# Patient Record
Sex: MALE | Race: OTHER | HISPANIC OR LATINO | URBAN - METROPOLITAN AREA
[De-identification: names, ages, dates, MRNs, and addresses within clinical notes are randomized per-mention and may not be internally consistent; named-entity substitution may affect disease eponyms.]

---

## 2022-01-22 ENCOUNTER — EMERGENCY (EMERGENCY)
Facility: HOSPITAL | Age: 32
LOS: 1 days | Discharge: HOME | End: 2022-01-22
Attending: EMERGENCY MEDICINE
Payer: MEDICAID

## 2022-01-22 VITALS
HEART RATE: 99 BPM | SYSTOLIC BLOOD PRESSURE: 161 MMHG | RESPIRATION RATE: 20 BRPM | DIASTOLIC BLOOD PRESSURE: 97 MMHG | TEMPERATURE: 99 F | OXYGEN SATURATION: 96 % | WEIGHT: 297.62 LBS

## 2022-01-22 VITALS
DIASTOLIC BLOOD PRESSURE: 88 MMHG | OXYGEN SATURATION: 99 % | SYSTOLIC BLOOD PRESSURE: 132 MMHG | HEART RATE: 82 BPM | RESPIRATION RATE: 18 BRPM

## 2022-01-22 PROCEDURE — 99284 EMERGENCY DEPT VISIT MOD MDM: CPT

## 2022-01-22 PROCEDURE — 99284 EMERGENCY DEPT VISIT MOD MDM: CPT | Mod: 25

## 2022-01-22 PROCEDURE — 96372 THER/PROPH/DIAG INJ SC/IM: CPT

## 2022-01-22 PROCEDURE — 72131 CT LUMBAR SPINE W/O DYE: CPT | Mod: MA

## 2022-01-22 PROCEDURE — 72131 CT LUMBAR SPINE W/O DYE: CPT | Mod: 26,MA

## 2022-01-22 RX ORDER — DEXAMETHASONE 0.5 MG/5ML
10 ELIXIR ORAL ONCE
Refills: 0 | Status: COMPLETED | OUTPATIENT
Start: 2022-01-22 | End: 2022-01-22

## 2022-01-22 RX ORDER — IBUPROFEN 200 MG
1 TABLET ORAL
Qty: 15 | Refills: 0
Start: 2022-01-22 | End: 2022-01-26

## 2022-01-22 RX ORDER — OXYCODONE AND ACETAMINOPHEN 5; 325 MG/1; MG/1
1 TABLET ORAL ONCE
Refills: 0 | Status: DISCONTINUED | OUTPATIENT
Start: 2022-01-22 | End: 2022-01-22

## 2022-01-22 RX ORDER — METHOCARBAMOL 500 MG/1
500 TABLET, FILM COATED ORAL ONCE
Refills: 0 | Status: DISCONTINUED | OUTPATIENT
Start: 2022-01-22 | End: 2022-01-22

## 2022-01-22 RX ORDER — TRAMADOL HYDROCHLORIDE 50 MG/1
1 TABLET ORAL
Qty: 15 | Refills: 0
Start: 2022-01-22 | End: 2022-01-26

## 2022-01-22 RX ORDER — DIAZEPAM 5 MG
1 TABLET ORAL
Qty: 12 | Refills: 0
Start: 2022-01-22 | End: 2022-01-25

## 2022-01-22 RX ORDER — METHOCARBAMOL 500 MG/1
750 TABLET, FILM COATED ORAL ONCE
Refills: 0 | Status: COMPLETED | OUTPATIENT
Start: 2022-01-22 | End: 2022-01-22

## 2022-01-22 RX ORDER — KETOROLAC TROMETHAMINE 30 MG/ML
60 SYRINGE (ML) INJECTION ONCE
Refills: 0 | Status: DISCONTINUED | OUTPATIENT
Start: 2022-01-22 | End: 2022-01-22

## 2022-01-22 RX ADMIN — METHOCARBAMOL 750 MILLIGRAM(S): 500 TABLET, FILM COATED ORAL at 13:39

## 2022-01-22 RX ADMIN — OXYCODONE AND ACETAMINOPHEN 1 TABLET(S): 5; 325 TABLET ORAL at 13:56

## 2022-01-22 RX ADMIN — Medication 60 MILLIGRAM(S): at 13:26

## 2022-01-22 RX ADMIN — Medication 60 MILLIGRAM(S): at 13:56

## 2022-01-22 RX ADMIN — Medication 10 MILLIGRAM(S): at 13:26

## 2022-01-22 RX ADMIN — OXYCODONE AND ACETAMINOPHEN 1 TABLET(S): 5; 325 TABLET ORAL at 13:26

## 2022-01-22 NOTE — ED PROVIDER NOTE - OBJECTIVE STATEMENT
31m no relevant med hx pw 4 days of bl lower back pain. radiates into the right lower ext. Patient denies numbness, tingling or weakness of the lower extremity and denies retention or incontinence of the bowel or bladder. took tylenol and warm bath to help with the pain, but it didn't help that much. ros neg for ha, vision loss, rhinorrhea, cp, sob, abd pain, vomiting, fever, chills, rash, bleeding, dysuria, testicular pain, depression

## 2022-01-22 NOTE — ED ADULT NURSE NOTE - MUSCULOSKELETAL ASSESSMENT
Alexander Cisse returned a call from [facility] concern his refill    Please call again - - -

## 2022-01-22 NOTE — ED PROVIDER NOTE - PATIENT PORTAL LINK FT
You can access the FollowMyHealth Patient Portal offered by [facility] by registering at the following website: [email or web address]. By joining [employer]’s FollowMyHealth portal, you will also be able to view your health information using other applications (apps) compatible with our system.

## 2022-01-22 NOTE — ED PROVIDER NOTE - NSFOLLOWUPINSTRUCTIONS_ED_ALL_ED_FT
Benjamin Perez IBUPROFEN para dolor moderado.    Anade TRAMADOL si el dolor se ponga mas grave.    Benjamin Perez VALIUM para relajar los musculos.     Kemp medicamentos pueden causar somnolencia, asi que no los tome si tiene que quedarse alerta.    Judy compresa caliente le puede ayudar tambien con tension en los musculos. [place] IBUPROFEN para dolor moderado.    Anade TRAMADOL si el dolor se ponga mas grave.    [place] VALIUM para relajar los musculos.     Kemp medicamentos pueden causar somnolencia, asi que no los tome si tiene que quedarse alerta.    Nikia compresa caliente le puede ayudar tambien con tension en los musculos.    Kym nikia kandace con la especialista de la mckenna.

## 2022-04-23 ENCOUNTER — EMERGENCY (EMERGENCY)
Facility: HOSPITAL | Age: 32
LOS: 1 days | Discharge: HOME | End: 2022-04-23
Attending: STUDENT IN AN ORGANIZED HEALTH CARE EDUCATION/TRAINING PROGRAM
Payer: MEDICAID

## 2022-04-23 VITALS
HEART RATE: 93 BPM | OXYGEN SATURATION: 98 % | RESPIRATION RATE: 16 BRPM | TEMPERATURE: 98 F | SYSTOLIC BLOOD PRESSURE: 131 MMHG | DIASTOLIC BLOOD PRESSURE: 80 MMHG

## 2022-04-23 VITALS
TEMPERATURE: 100 F | DIASTOLIC BLOOD PRESSURE: 94 MMHG | OXYGEN SATURATION: 98 % | WEIGHT: 286.6 LBS | HEART RATE: 124 BPM | RESPIRATION RATE: 17 BRPM | SYSTOLIC BLOOD PRESSURE: 146 MMHG

## 2022-04-23 LAB
ALBUMIN SERPL ELPH-MCNC: 3.9 G/DL — SIGNIFICANT CHANGE UP (ref 3.5–5)
ALP SERPL-CCNC: 91 U/L — SIGNIFICANT CHANGE UP (ref 40–120)
ALT FLD-CCNC: 34 U/L DA — SIGNIFICANT CHANGE UP (ref 10–60)
ANION GAP SERPL CALC-SCNC: 6 MMOL/L — SIGNIFICANT CHANGE UP (ref 5–17)
APPEARANCE UR: CLEAR — SIGNIFICANT CHANGE UP
APTT BLD: 35.4 SEC — SIGNIFICANT CHANGE UP (ref 27.5–35.5)
AST SERPL-CCNC: 14 U/L — SIGNIFICANT CHANGE UP (ref 10–40)
BACTERIA # UR AUTO: ABNORMAL /HPF
BASOPHILS # BLD AUTO: 0.04 K/UL — SIGNIFICANT CHANGE UP (ref 0–0.2)
BASOPHILS NFR BLD AUTO: 0.3 % — SIGNIFICANT CHANGE UP (ref 0–2)
BILIRUB SERPL-MCNC: 0.5 MG/DL — SIGNIFICANT CHANGE UP (ref 0.2–1.2)
BILIRUB UR-MCNC: NEGATIVE — SIGNIFICANT CHANGE UP
BUN SERPL-MCNC: 10 MG/DL — SIGNIFICANT CHANGE UP (ref 7–18)
CALCIUM SERPL-MCNC: 9.6 MG/DL — SIGNIFICANT CHANGE UP (ref 8.4–10.5)
CHLORIDE SERPL-SCNC: 101 MMOL/L — SIGNIFICANT CHANGE UP (ref 96–108)
CO2 SERPL-SCNC: 28 MMOL/L — SIGNIFICANT CHANGE UP (ref 22–31)
COLOR SPEC: YELLOW — SIGNIFICANT CHANGE UP
CREAT SERPL-MCNC: 0.75 MG/DL — SIGNIFICANT CHANGE UP (ref 0.5–1.3)
DIFF PNL FLD: NEGATIVE — SIGNIFICANT CHANGE UP
EGFR: 123 ML/MIN/1.73M2 — SIGNIFICANT CHANGE UP
EOSINOPHIL # BLD AUTO: 0.15 K/UL — SIGNIFICANT CHANGE UP (ref 0–0.5)
EOSINOPHIL NFR BLD AUTO: 1.1 % — SIGNIFICANT CHANGE UP (ref 0–6)
EPI CELLS # UR: SIGNIFICANT CHANGE UP /HPF
FLUAV AG NPH QL: SIGNIFICANT CHANGE UP
FLUBV AG NPH QL: SIGNIFICANT CHANGE UP
GLUCOSE SERPL-MCNC: 106 MG/DL — HIGH (ref 70–99)
GLUCOSE UR QL: NEGATIVE — SIGNIFICANT CHANGE UP
HCT VFR BLD CALC: 47.4 % — SIGNIFICANT CHANGE UP (ref 39–50)
HGB BLD-MCNC: 16 G/DL — SIGNIFICANT CHANGE UP (ref 13–17)
HIV 1 & 2 AB SERPL IA.RAPID: SIGNIFICANT CHANGE UP
IMM GRANULOCYTES NFR BLD AUTO: 0.3 % — SIGNIFICANT CHANGE UP (ref 0–1.5)
INR BLD: 1.01 RATIO — SIGNIFICANT CHANGE UP (ref 0.88–1.16)
KETONES UR-MCNC: NEGATIVE — SIGNIFICANT CHANGE UP
LACTATE SERPL-SCNC: 1 MMOL/L — SIGNIFICANT CHANGE UP (ref 0.7–2)
LEUKOCYTE ESTERASE UR-ACNC: NEGATIVE — SIGNIFICANT CHANGE UP
LYMPHOCYTES # BLD AUTO: 2.91 K/UL — SIGNIFICANT CHANGE UP (ref 1–3.3)
LYMPHOCYTES # BLD AUTO: 21.6 % — SIGNIFICANT CHANGE UP (ref 13–44)
MCHC RBC-ENTMCNC: 28.1 PG — SIGNIFICANT CHANGE UP (ref 27–34)
MCHC RBC-ENTMCNC: 33.8 GM/DL — SIGNIFICANT CHANGE UP (ref 32–36)
MCV RBC AUTO: 83.2 FL — SIGNIFICANT CHANGE UP (ref 80–100)
MONOCYTES # BLD AUTO: 0.79 K/UL — SIGNIFICANT CHANGE UP (ref 0–0.9)
MONOCYTES NFR BLD AUTO: 5.9 % — SIGNIFICANT CHANGE UP (ref 2–14)
NEUTROPHILS # BLD AUTO: 9.56 K/UL — HIGH (ref 1.8–7.4)
NEUTROPHILS NFR BLD AUTO: 70.8 % — SIGNIFICANT CHANGE UP (ref 43–77)
NITRITE UR-MCNC: NEGATIVE — SIGNIFICANT CHANGE UP
NRBC # BLD: 0 /100 WBCS — SIGNIFICANT CHANGE UP (ref 0–0)
PH UR: 6 — SIGNIFICANT CHANGE UP (ref 5–8)
PLATELET # BLD AUTO: 357 K/UL — SIGNIFICANT CHANGE UP (ref 150–400)
POTASSIUM SERPL-MCNC: 4.2 MMOL/L — SIGNIFICANT CHANGE UP (ref 3.5–5.3)
POTASSIUM SERPL-SCNC: 4.2 MMOL/L — SIGNIFICANT CHANGE UP (ref 3.5–5.3)
PROT SERPL-MCNC: 8.8 G/DL — HIGH (ref 6–8.3)
PROT UR-MCNC: 30 MG/DL
PROTHROM AB SERPL-ACNC: 12 SEC — SIGNIFICANT CHANGE UP (ref 10.5–13.4)
RBC # BLD: 5.7 M/UL — SIGNIFICANT CHANGE UP (ref 4.2–5.8)
RBC # FLD: 13 % — SIGNIFICANT CHANGE UP (ref 10.3–14.5)
RBC CASTS # UR COMP ASSIST: SIGNIFICANT CHANGE UP /HPF (ref 0–2)
SARS-COV-2 RNA SPEC QL NAA+PROBE: SIGNIFICANT CHANGE UP
SODIUM SERPL-SCNC: 135 MMOL/L — SIGNIFICANT CHANGE UP (ref 135–145)
SP GR SPEC: 1.01 — SIGNIFICANT CHANGE UP (ref 1.01–1.02)
UROBILINOGEN FLD QL: NEGATIVE — SIGNIFICANT CHANGE UP
WBC # BLD: 13.49 K/UL — HIGH (ref 3.8–10.5)
WBC # FLD AUTO: 13.49 K/UL — HIGH (ref 3.8–10.5)
WBC UR QL: SIGNIFICANT CHANGE UP /HPF (ref 0–5)

## 2022-04-23 PROCEDURE — 86703 HIV-1/HIV-2 1 RESULT ANTBDY: CPT

## 2022-04-23 PROCEDURE — 99285 EMERGENCY DEPT VISIT HI MDM: CPT

## 2022-04-23 PROCEDURE — 99285 EMERGENCY DEPT VISIT HI MDM: CPT | Mod: 25

## 2022-04-23 PROCEDURE — 87086 URINE CULTURE/COLONY COUNT: CPT

## 2022-04-23 PROCEDURE — 85610 PROTHROMBIN TIME: CPT

## 2022-04-23 PROCEDURE — 70487 CT MAXILLOFACIAL W/DYE: CPT | Mod: 26,MA

## 2022-04-23 PROCEDURE — 83605 ASSAY OF LACTIC ACID: CPT

## 2022-04-23 PROCEDURE — 93005 ELECTROCARDIOGRAM TRACING: CPT

## 2022-04-23 PROCEDURE — 87637 SARSCOV2&INF A&B&RSV AMP PRB: CPT

## 2022-04-23 PROCEDURE — 36415 COLL VENOUS BLD VENIPUNCTURE: CPT

## 2022-04-23 PROCEDURE — 93010 ELECTROCARDIOGRAM REPORT: CPT

## 2022-04-23 PROCEDURE — 71046 X-RAY EXAM CHEST 2 VIEWS: CPT

## 2022-04-23 PROCEDURE — 96361 HYDRATE IV INFUSION ADD-ON: CPT

## 2022-04-23 PROCEDURE — 70487 CT MAXILLOFACIAL W/DYE: CPT | Mod: MA

## 2022-04-23 PROCEDURE — 85730 THROMBOPLASTIN TIME PARTIAL: CPT

## 2022-04-23 PROCEDURE — 71046 X-RAY EXAM CHEST 2 VIEWS: CPT | Mod: 26

## 2022-04-23 PROCEDURE — 80053 COMPREHEN METABOLIC PANEL: CPT

## 2022-04-23 PROCEDURE — 87040 BLOOD CULTURE FOR BACTERIA: CPT

## 2022-04-23 PROCEDURE — 96374 THER/PROPH/DIAG INJ IV PUSH: CPT | Mod: XU

## 2022-04-23 PROCEDURE — 85025 COMPLETE CBC W/AUTO DIFF WBC: CPT

## 2022-04-23 PROCEDURE — 81001 URINALYSIS AUTO W/SCOPE: CPT

## 2022-04-23 RX ORDER — SODIUM CHLORIDE 9 MG/ML
2200 INJECTION INTRAMUSCULAR; INTRAVENOUS; SUBCUTANEOUS ONCE
Refills: 0 | Status: COMPLETED | OUTPATIENT
Start: 2022-04-23 | End: 2022-04-23

## 2022-04-23 RX ORDER — PIPERACILLIN AND TAZOBACTAM 4; .5 G/20ML; G/20ML
3.38 INJECTION, POWDER, LYOPHILIZED, FOR SOLUTION INTRAVENOUS ONCE
Refills: 0 | Status: COMPLETED | OUTPATIENT
Start: 2022-04-23 | End: 2022-04-23

## 2022-04-23 RX ORDER — IBUPROFEN 200 MG
600 TABLET ORAL ONCE
Refills: 0 | Status: COMPLETED | OUTPATIENT
Start: 2022-04-23 | End: 2022-04-23

## 2022-04-23 RX ADMIN — SODIUM CHLORIDE 2200 MILLILITER(S): 9 INJECTION INTRAMUSCULAR; INTRAVENOUS; SUBCUTANEOUS at 08:30

## 2022-04-23 RX ADMIN — Medication 600 MILLIGRAM(S): at 09:28

## 2022-04-23 RX ADMIN — PIPERACILLIN AND TAZOBACTAM 200 GRAM(S): 4; .5 INJECTION, POWDER, LYOPHILIZED, FOR SOLUTION INTRAVENOUS at 09:21

## 2022-04-23 RX ADMIN — SODIUM CHLORIDE 2200 MILLILITER(S): 9 INJECTION INTRAMUSCULAR; INTRAVENOUS; SUBCUTANEOUS at 09:28

## 2022-04-23 RX ADMIN — Medication 600 MILLIGRAM(S): at 08:30

## 2022-04-23 NOTE — ED ADULT NURSE NOTE - OBJECTIVE STATEMENT
Pt AOx4, ambulatory, c/o fever and left jaw pain and swelling since last night. PT denies difficulty breathing or swallowing.

## 2022-04-23 NOTE — ED PROVIDER NOTE - CLINICAL SUMMARY MEDICAL DECISION MAKING FREE TEXT BOX
31M presenting with left facial swelling. no dental procedures. tolerating secretions. low concern for ludwigs but will get labs, CTA max/face. will reassess.

## 2022-04-23 NOTE — ED PROVIDER NOTE - PHYSICAL EXAMINATION
General: well appearing male, no acute distress   HEENT: left lateral mandibular swelling, tenderness to palpation, no submandibular fullness, no dental pain or abscess   Respiratory: normal work of breathing, lungs clear to auscultation bilaterally   Cardiac: regular rate and rhythm   Abdomen: soft, non-tender, no guarding or rebound   MSK: no swelling or tenderness of lower extremities, moving all extremities spontaneously   Skin: warm, dry   Neuro: A&Ox3  Psych: appropriate affect

## 2022-04-23 NOTE — ED PROVIDER NOTE - OBJECTIVE STATEMENT
31M, no significant pmh, presenting with left sided facial pain and swelling. patient reports the swelling has worsened since yesterday. no fever, chest pain, shortness of breath, nausea, vomiting. no recent dental work.

## 2022-04-23 NOTE — ED PROVIDER NOTE - PATIENT PORTAL LINK FT
You can access the FollowMyHealth Patient Portal offered by [facility] by registering at the following website: [email or web address]. By joining [employer]’s FollowMyHealth portal, you will also be able to view your health information using other applications (apps) compatible with our system.

## 2022-04-23 NOTE — ED PROVIDER NOTE - NSFOLLOWUPINSTRUCTIONS_ED_ALL_ED_FT
Lo vieron en el departamento de emergencias por hinchazón facial.    Kym un seguimiento con carreno médico de atención primaria en las próximas 24 a 48 horas.    Por favor termine todos clyde antibióticos.    Antonito ibuprofeno y Tylenol según lo prescrito en los frascos para controlar los síntomas.    Si tiene síntomas que empeoran, dolor facial intenso, hinchazón, dificultad para respirar o tragar, regrese al departamento de emergencias.    Translation via Google Translate. Cannot guarantee accuracy.     You were seen in the emergency department for facial swelling.   Please follow-up with your primary care doctor in the next 24-48 hours.   Please finish all of your antibiotics.   Please take Ibuprofen and Tylenol as prescribed on the bottles for symptom control.   If you have any worsening symptoms, severe facial pain, swelling, difficulty breathing or swallowing, please return to the emergency department.

## 2022-04-24 LAB
CULTURE RESULTS: NO GROWTH — SIGNIFICANT CHANGE UP
SPECIMEN SOURCE: SIGNIFICANT CHANGE UP

## 2022-04-28 LAB
CULTURE RESULTS: SIGNIFICANT CHANGE UP
CULTURE RESULTS: SIGNIFICANT CHANGE UP
SPECIMEN SOURCE: SIGNIFICANT CHANGE UP
SPECIMEN SOURCE: SIGNIFICANT CHANGE UP

## 2022-07-11 ENCOUNTER — EMERGENCY (EMERGENCY)
Facility: HOSPITAL | Age: 32
LOS: 1 days | Discharge: HOME | End: 2022-07-11
Attending: EMERGENCY MEDICINE
Payer: MEDICAID

## 2022-07-11 VITALS
SYSTOLIC BLOOD PRESSURE: 128 MMHG | WEIGHT: 279.99 LBS | OXYGEN SATURATION: 98 % | TEMPERATURE: 98 F | RESPIRATION RATE: 18 BRPM | DIASTOLIC BLOOD PRESSURE: 78 MMHG | HEART RATE: 93 BPM | HEIGHT: 72 IN

## 2022-07-11 PROCEDURE — 99283 EMERGENCY DEPT VISIT LOW MDM: CPT

## 2022-07-11 RX ORDER — MUPIROCIN 20 MG/G
1 OINTMENT TOPICAL
Qty: 30 | Refills: 0
Start: 2022-07-11 | End: 2022-07-20

## 2022-07-11 NOTE — ED PROVIDER NOTE - NSFOLLOWUPINSTRUCTIONS_ED_ALL_ED_FT
Compre el gel de baño Neutrogena de venta tera (con peróxido de benzoilo) y lávese la piel dos veces al día.  Seguimiento con el médico de atención primaria en 3-5 días.  Si experimenta síntomas nuevos o que empeoran, o si está preocupado, siempre puede regresar a la emergencia para nikia reevaluación.  Si le dieron alguna receta sarahy la visita de carlos thompson según lo prescrito. Si tiene alguna aleyda puede preguntar al farmacéutico.    * * *    Buy over the counter Neutrogena body wash (with benzoyl peroxide) and wash skin twice a day.  Follow up with the primary care doctor in 3-5 days.  If you experience any new or worsening symptoms or if you are concerned you can always come back to the emergency for a re-evaluation.  If there were any prescriptions given to you during the visit today take them as prescribed. If you have any questions you can ask the pharmacist.

## 2022-07-11 NOTE — ED PROVIDER NOTE - NS ED ATTENDING STATEMENT MOD
This was a shared visit with the DEYVI. I reviewed and verified the documentation and independently performed the documented:

## 2022-07-11 NOTE — ED PROVIDER NOTE - OBJECTIVE STATEMENT
#ID [id number] (Maryjane): 31 year-old male, no significant PMHx, presents with rash x 3 days. Gradual onset of rash to upper back, chest and bilateral arms. Getting progressively worse. Associated with some itching sensation at night. No recent illness, fever, chills, wearing constrictive clothes, hot tub exposure or any sick contacts or any other complaints.

## 2022-07-11 NOTE — ED PROVIDER NOTE - PATIENT PORTAL LINK FT
You can access the FollowMyHealth Patient Portal offered by [facility] by registering at the following website: [email or web address]. By joining [employer]’s FollowMyHealth portal, you will also be able to view your health information using other applications (apps) compatible with our system.

## 2022-07-11 NOTE — ED PROVIDER NOTE - PHYSICAL EXAMINATION
Scattered pustular rash to the upper back, shoulder and across the chest. Also slightly spreading down to the humerus area bilaterally. No rash inside the mouth.  No vesicles. No angioedema.

## 2022-07-11 NOTE — ED PROVIDER NOTE - CLINICAL SUMMARY MEDICAL DECISION MAKING FREE TEXT BOX
31 year-old male, with rash x 3 days. History and exam consistent with mild folliculitis. Low suspicion for allergic etiology. No e/o erythema multiforme, SJS/TEN, Lyme, cellulitis, necrotizing fasciitis, no angioedema, meningococcemia or tejinder mountain spotted fever.

## 2022-07-11 NOTE — ED PROVIDER NOTE - ATTENDING APP SHARED VISIT CONTRIBUTION OF CARE
seen with acp  c/o pustular dermatitis over back shoulders and upper chest wall  no fever no chills no signs of frances chasity syndrome  will start on mupirocin  agree with acps assessment  and physical and disposition

## 2022-07-11 NOTE — ED ADULT NURSE NOTE - NSIMPLEMENTINTERV_GEN_ALL_ED
Implemented All Universal Safety Interventions:  Many to call system. Call bell, personal items and telephone within reach. Instruct patient to call for assistance. Room bathroom lighting operational. Non-slip footwear when patient is off stretcher. Physically safe environment: no spills, clutter or unnecessary equipment. Stretcher in lowest position, wheels locked, appropriate side rails in place.

## 2023-03-16 NOTE — ED ADULT NURSE NOTE - CAS EDP DISCH TYPE
